# Patient Record
Sex: MALE | Race: OTHER | HISPANIC OR LATINO | URBAN - METROPOLITAN AREA
[De-identification: names, ages, dates, MRNs, and addresses within clinical notes are randomized per-mention and may not be internally consistent; named-entity substitution may affect disease eponyms.]

---

## 2017-04-24 ENCOUNTER — EMERGENCY (EMERGENCY)
Facility: HOSPITAL | Age: 52
LOS: 1 days | Discharge: HOME | End: 2017-04-24
Attending: EMERGENCY MEDICINE | Admitting: EMERGENCY MEDICINE
Payer: MEDICAID

## 2017-04-24 VITALS
DIASTOLIC BLOOD PRESSURE: 77 MMHG | OXYGEN SATURATION: 100 % | TEMPERATURE: 98 F | SYSTOLIC BLOOD PRESSURE: 112 MMHG | RESPIRATION RATE: 18 BRPM | HEART RATE: 57 BPM

## 2017-04-24 VITALS
DIASTOLIC BLOOD PRESSURE: 76 MMHG | OXYGEN SATURATION: 98 % | SYSTOLIC BLOOD PRESSURE: 123 MMHG | TEMPERATURE: 98 F | HEART RATE: 67 BPM | RESPIRATION RATE: 18 BRPM

## 2017-04-24 DIAGNOSIS — Z98.890 OTHER SPECIFIED POSTPROCEDURAL STATES: Chronic | ICD-10-CM

## 2017-04-24 LAB — TROPONIN T SERPL-MCNC: < 0.06 NG/ML — SIGNIFICANT CHANGE UP (ref 0–0.06)

## 2017-04-24 PROCEDURE — 71020: CPT | Mod: 26

## 2017-04-24 PROCEDURE — 99284 EMERGENCY DEPT VISIT MOD MDM: CPT | Mod: 25

## 2017-04-24 RX ORDER — KETOROLAC TROMETHAMINE 30 MG/ML
30 SYRINGE (ML) INJECTION ONCE
Qty: 0 | Refills: 0 | Status: DISCONTINUED | OUTPATIENT
Start: 2017-04-24 | End: 2017-04-24

## 2017-04-24 RX ADMIN — Medication 30 MILLIGRAM(S): at 10:09

## 2017-04-24 RX ADMIN — Medication 30 MILLIGRAM(S): at 11:18

## 2017-04-24 NOTE — ED PROVIDER NOTE - ATTENDING CONTRIBUTION TO CARE
Dr. Ramirez Seen and examined, have discussed plan of care with PA.   I agree with note as stated above, having amended the EMR as needed to reflect the findings. 51M with upper chest and shoulder pains with movement and deep breathing, no SOB at rest, PERC negative, pt. is physically active but denies exertion or trauma closely related to pain- lifted at work, then had inc. pain the next day. CW mild tender, no skin findings, clear lungs, soft, NT abd, full ROM RUE with good distal pulses and strength 5/5.

## 2017-04-24 NOTE — ED ADULT TRIAGE NOTE - CHIEF COMPLAINT QUOTE
c/o right sided chest, shoulder and abdominal pain since Saturday. Denies fever or SOB or any other symptoms.. PMH of HTN

## 2017-04-24 NOTE — ED PROVIDER NOTE - OBJECTIVE STATEMENT
50 yo male with PMHx of HTN presents to the ED with right sided chest pain, right shoulder and arm pain x 3 days. Pt states "pain is on the inside", pt unsure if the pain came on after lifting something at work on Friday. Pt states pain is worse when he takes a deep breath and he gets minimal relief with ibuprofen. Admits to SOB with deep inspiration. Denies h/a, changes in vision, n/v/f/c, abdominal pain, back pain, urinary sx.

## 2017-04-24 NOTE — ED PROVIDER NOTE - CARE PLAN
Principal Discharge DX:	Chest pain on breathing  Instructions for follow-up, activity and diet:	Follow up with your Primary Medical Doctor within 2-3days. If results or reports were given to you, show copies of your reports given to you. Take Naproxen as prescribed on bottle for the pain. Worsening or continued chest pain, shortness of breath, weakness, return to ED.  Secondary Diagnosis:	Shoulder pain  Secondary Diagnosis:	Rib pain

## 2017-04-24 NOTE — ED PROVIDER NOTE - CHPI ED SYMPTOMS NEG
no diaphoresis/no syncope/no nausea/no cough/no chills/no vomiting/no fever/no back pain/no dizziness

## 2017-04-24 NOTE — ED PROVIDER NOTE - PLAN OF CARE
Follow up with your Primary Medical Doctor within 2-3days. If results or reports were given to you, show copies of your reports given to you. Take Naproxen as prescribed on bottle for the pain. Worsening or continued chest pain, shortness of breath, weakness, return to ED.

## 2017-04-25 NOTE — ED POST DISCHARGE NOTE - RESULT SUMMARY
Pt's son called ED regarding Rx for naproxen; [facility] stated not covered by pt's insurance; \[facility]\"" pt was told to go to a different pharmacy.  Pt's son requested to send Rx to [facility] [address], [place].  I spoke to pt (Citizen of [place] speaking) who gave permission for me to speak with his son regarding this call.  I informed son that naproxen is available OTC as Aleve, in case pt's insurance still not covering med at [facility].  I also informed him that naproxen should be taken every 12 hours (not 6) and advised to take med with food.  Pt was recently taking ibuprofen; I informed pt's son that ibuprofen and naproxen should not be taken simultaneously as meds are similar.  Pt's son verbalized understanding to all; will go to [facility] to  Rx.

## 2018-06-10 ENCOUNTER — EMERGENCY (EMERGENCY)
Facility: HOSPITAL | Age: 53
LOS: 1 days | Discharge: HOME | End: 2018-06-10
Attending: EMERGENCY MEDICINE | Admitting: EMERGENCY MEDICINE
Payer: SELF-PAY

## 2018-06-10 VITALS
OXYGEN SATURATION: 100 % | TEMPERATURE: 98 F | RESPIRATION RATE: 16 BRPM | DIASTOLIC BLOOD PRESSURE: 89 MMHG | SYSTOLIC BLOOD PRESSURE: 146 MMHG | HEART RATE: 82 BPM

## 2018-06-10 DIAGNOSIS — Z98.890 OTHER SPECIFIED POSTPROCEDURAL STATES: Chronic | ICD-10-CM

## 2018-06-10 LAB
ALBUMIN SERPL ELPH-MCNC: 4.5 G/DL — SIGNIFICANT CHANGE UP (ref 3.3–5)
ALP SERPL-CCNC: 56 U/L — SIGNIFICANT CHANGE UP (ref 40–120)
ALT FLD-CCNC: 31 U/L — SIGNIFICANT CHANGE UP (ref 4–41)
AST SERPL-CCNC: 23 U/L — SIGNIFICANT CHANGE UP (ref 4–40)
BASOPHILS # BLD AUTO: 0.05 K/UL — SIGNIFICANT CHANGE UP (ref 0–0.2)
BASOPHILS NFR BLD AUTO: 0.4 % — SIGNIFICANT CHANGE UP (ref 0–2)
BILIRUB SERPL-MCNC: 0.4 MG/DL — SIGNIFICANT CHANGE UP (ref 0.2–1.2)
BUN SERPL-MCNC: 10 MG/DL — SIGNIFICANT CHANGE UP (ref 7–23)
CALCIUM SERPL-MCNC: 9.3 MG/DL — SIGNIFICANT CHANGE UP (ref 8.4–10.5)
CHLORIDE SERPL-SCNC: 99 MMOL/L — SIGNIFICANT CHANGE UP (ref 98–107)
CO2 SERPL-SCNC: 27 MMOL/L — SIGNIFICANT CHANGE UP (ref 22–31)
CREAT SERPL-MCNC: 1.14 MG/DL — SIGNIFICANT CHANGE UP (ref 0.5–1.3)
EOSINOPHIL # BLD AUTO: 0.04 K/UL — SIGNIFICANT CHANGE UP (ref 0–0.5)
EOSINOPHIL NFR BLD AUTO: 0.3 % — SIGNIFICANT CHANGE UP (ref 0–6)
GLUCOSE SERPL-MCNC: 99 MG/DL — SIGNIFICANT CHANGE UP (ref 70–99)
HCT VFR BLD CALC: 46.6 % — SIGNIFICANT CHANGE UP (ref 39–50)
HGB BLD-MCNC: 15.2 G/DL — SIGNIFICANT CHANGE UP (ref 13–17)
IMM GRANULOCYTES # BLD AUTO: 0.05 # — SIGNIFICANT CHANGE UP
IMM GRANULOCYTES NFR BLD AUTO: 0.4 % — SIGNIFICANT CHANGE UP (ref 0–1.5)
LYMPHOCYTES # BLD AUTO: 19.2 % — SIGNIFICANT CHANGE UP (ref 13–44)
LYMPHOCYTES # BLD AUTO: 2.42 K/UL — SIGNIFICANT CHANGE UP (ref 1–3.3)
MCHC RBC-ENTMCNC: 29.5 PG — SIGNIFICANT CHANGE UP (ref 27–34)
MCHC RBC-ENTMCNC: 32.6 % — SIGNIFICANT CHANGE UP (ref 32–36)
MCV RBC AUTO: 90.3 FL — SIGNIFICANT CHANGE UP (ref 80–100)
MONOCYTES # BLD AUTO: 1.08 K/UL — HIGH (ref 0–0.9)
MONOCYTES NFR BLD AUTO: 8.6 % — SIGNIFICANT CHANGE UP (ref 2–14)
NEUTROPHILS # BLD AUTO: 8.94 K/UL — HIGH (ref 1.8–7.4)
NEUTROPHILS NFR BLD AUTO: 71.1 % — SIGNIFICANT CHANGE UP (ref 43–77)
NRBC # FLD: 0 — SIGNIFICANT CHANGE UP
PLATELET # BLD AUTO: 293 K/UL — SIGNIFICANT CHANGE UP (ref 150–400)
PMV BLD: 11.7 FL — SIGNIFICANT CHANGE UP (ref 7–13)
POTASSIUM SERPL-MCNC: 4.3 MMOL/L — SIGNIFICANT CHANGE UP (ref 3.5–5.3)
POTASSIUM SERPL-SCNC: 4.3 MMOL/L — SIGNIFICANT CHANGE UP (ref 3.5–5.3)
PROT SERPL-MCNC: 7.2 G/DL — SIGNIFICANT CHANGE UP (ref 6–8.3)
RBC # BLD: 5.16 M/UL — SIGNIFICANT CHANGE UP (ref 4.2–5.8)
RBC # FLD: 12.7 % — SIGNIFICANT CHANGE UP (ref 10.3–14.5)
SODIUM SERPL-SCNC: 138 MMOL/L — SIGNIFICANT CHANGE UP (ref 135–145)
WBC # BLD: 12.58 K/UL — HIGH (ref 3.8–10.5)
WBC # FLD AUTO: 12.58 K/UL — HIGH (ref 3.8–10.5)

## 2018-06-10 PROCEDURE — 99284 EMERGENCY DEPT VISIT MOD MDM: CPT

## 2018-06-10 RX ORDER — KETOROLAC TROMETHAMINE 30 MG/ML
15 SYRINGE (ML) INJECTION ONCE
Qty: 0 | Refills: 0 | Status: DISCONTINUED | OUTPATIENT
Start: 2018-06-10 | End: 2018-06-10

## 2018-06-10 RX ORDER — MECLIZINE HCL 12.5 MG
12.5 TABLET ORAL ONCE
Qty: 0 | Refills: 0 | Status: COMPLETED | OUTPATIENT
Start: 2018-06-10 | End: 2018-06-10

## 2018-06-10 RX ORDER — CIPROFLOXACIN/HYDROCORTISONE 0.2 %-1 %
3 SUSPENSION, DROPS(FINAL DOSAGE FORM)(ML) OTIC (EAR) ONCE
Qty: 0 | Refills: 0 | Status: COMPLETED | OUTPATIENT
Start: 2018-06-10 | End: 2018-06-10

## 2018-06-10 RX ORDER — SODIUM CHLORIDE 9 MG/ML
1000 INJECTION INTRAMUSCULAR; INTRAVENOUS; SUBCUTANEOUS ONCE
Qty: 0 | Refills: 0 | Status: COMPLETED | OUTPATIENT
Start: 2018-06-10 | End: 2018-06-10

## 2018-06-10 RX ORDER — MECLIZINE HCL 12.5 MG
1 TABLET ORAL
Qty: 15 | Refills: 0 | OUTPATIENT
Start: 2018-06-10 | End: 2018-06-14

## 2018-06-10 RX ORDER — CIPROFLOXACIN AND DEXAMETHASONE 3; 1 MG/ML; MG/ML
4 SUSPENSION/ DROPS AURICULAR (OTIC) ONCE
Qty: 0 | Refills: 0 | Status: DISCONTINUED | OUTPATIENT
Start: 2018-06-10 | End: 2018-06-10

## 2018-06-10 RX ADMIN — SODIUM CHLORIDE 1000 MILLILITER(S): 9 INJECTION INTRAMUSCULAR; INTRAVENOUS; SUBCUTANEOUS at 18:03

## 2018-06-10 RX ADMIN — Medication 3 DROP(S): at 19:19

## 2018-06-10 RX ADMIN — Medication 15 MILLIGRAM(S): at 18:02

## 2018-06-10 RX ADMIN — Medication 12.5 MILLIGRAM(S): at 18:02

## 2018-06-10 RX ADMIN — Medication 1 TABLET(S): at 18:57

## 2018-06-10 RX ADMIN — Medication 15 MILLIGRAM(S): at 19:19

## 2018-06-10 NOTE — ED PROVIDER NOTE - RIGHT EAR
EXTERNAL CANAL INFLAMMATION/TM RED/AURICULAR/TRAGAL TENDERNESS/minimal swelling to skin at preauricular region w/o fluctuance; no redness; no warmth

## 2018-06-10 NOTE — ED PROVIDER NOTE - PROGRESS NOTE DETAILS
SEEMA JAY: Pt notes improvement in pain.  Pt medically stable for discharge.  Pt to follow up with PMD and ENT (referral list provided).

## 2018-06-10 NOTE — ED PROVIDER NOTE - PLAN OF CARE
Advance activity as tolerated.  Continue all previously prescribed medications as directed unless otherwise instructed.  Apply Cipro-Hydrocortisone drops to right ear three drops twice a day for 1 week.  Take Augmentin twice a day for 1 week.  Take Motrin (also sold as Advil or Ibuprofen) 400-600 mg (two or three 200 mg over the counter pills) every 8 hours as needed for moderate pain or fevers-- take with food. Take Tylenol 650mg (Two 325 mg pills) every 4-6 hours as needed for pain or fevers. Follow up with your primary care physician and ENT (referral list provided) in 48-72 hours- bring copies of your results.  Return to the ER for worsening or persistent symptoms, including but not limited to fevers, worsening/persistent pain, swelling, drainage from ear, dizziness, difficulty walking, headaches and/or ANY NEW OR CONCERNING SYMPTOMS. If you have issues obtaining follow up, please call: [phone] to obtain a doctor or specialist who takes your insurance in your area.  You may call [phone] to make an appointment with the internal medicine clinic.

## 2018-06-10 NOTE — ED PROVIDER NOTE - CARE PLAN
Principal Discharge DX:	Otitis externa  Assessment and plan of treatment:	Advance activity as tolerated.  Continue all previously prescribed medications as directed unless otherwise instructed.  Apply Cipro-Hydrocortisone drops to right ear three drops twice a day for 1 week.  Take Augmentin twice a day for 1 week.  Take Motrin (also sold as Advil or Ibuprofen) 400-600 mg (two or three 200 mg over the counter pills) every 8 hours as needed for moderate pain or fevers-- take with food. Take Tylenol 650mg (Two 325 mg pills) every 4-6 hours as needed for pain or fevers. Follow up with your primary care physician and ENT (referral list provided) in 48-72 hours- bring copies of your results.  Return to the ER for worsening or persistent symptoms, including but not limited to fevers, worsening/persistent pain, swelling, drainage from ear, dizziness, difficulty walking, headaches and/or ANY NEW OR CONCERNING SYMPTOMS. If you have issues obtaining follow up, please call: [phone] to obtain a doctor or specialist who takes your insurance in your area.  You may call [phone] to make an appointment with the internal medicine clinic.

## 2018-06-10 NOTE — ED PROVIDER NOTE - OBJECTIVE STATEMENT
Pt is a 53 y/o M nonsmoker PMHx spine surgery p/w Pt is a 53 y/o M nonsmoker PMHx spine surgery p/w right ear pain since yesterday.  Pt speaks Amharic; pacific  ID# [id number] used.  Pt states he had gradual onset, worsening right ear pain, pointing to inner ear and skin at pre-auricular region.  Pt notes he had similar symptoms years ago which improved with antibiotics.  Pt also notes mild ringing and feeling off balance.  Denies any fevers, chills, trauma, drainage from ear.

## 2018-06-10 NOTE — ED PROVIDER NOTE - MEDICAL DECISION MAKING DETAILS
Pt is a 51 y/o M nonsmoker PMHx spine surgery p/w right ear pain since yesterday -- likely otitis externa with otitis media -- labs, antibiotic ear drops, augmentin, ENT follow up

## 2018-06-10 NOTE — ED PROVIDER NOTE - ATTENDING CONTRIBUTION TO CARE
I was physically present for the E/M service provided. I agree with above history, physical, and plan which I have reviewed and edited where appropriate. I was physically present for the key portions of the service provided.    52M PMH of LBP p/w right ear pain I was physically present for the E/M service provided. I agree with above history, physical, and plan which I have reviewed and edited where appropriate. I was physically present for the key portions of the service provided.    52M PMH of LBP p/w right ear pain #[id number] I was physically present for the E/M service provided. I agree with above history, physical, and plan which I have reviewed and edited where appropriate. I was physically present for the key portions of the service provided.    52M PMH of LBP p/w right ear pain x1 day a/w dizziness. No fever. No dental or throat pain.  #[id number]. Afebrile. Right ear: moderately swollen external ear canal without exudate. Unable to visualize TM due to swelling. Mild erythema. Mild pain with auricle retraction. Mild erythema of tragus. No mastoid TTP. Neurologic exam: A&O x3, speech clear, LI, CN II-XII intact, motor strength +5/5 in all extremities, sensation equal bilaterally, finger-to-nose normal. Will tx with Abx outpt and ENT f/u as needed.

## 2018-09-25 NOTE — ED ADULT NURSE NOTE - PRO INTERPRETER NEED 2
Health Maintenance Due   Topic Date Due   • Zoster Vaccine (1 of 2) 06/20/1989   • Diabetes Eye Exam  03/09/2017   • Influenza Vaccine (1) 07/01/2018       Patient is due for topics as listed above but declines Immunization(s) Influenza and Shingles at this time.  Declination signed for Immunization(s) Influenza and Shingles.      Unaddressed Risk Adjusted HCC Categories and Diagnoses  HCC 12 - Breast, Prostate, Other Cancers & Tumors   Unaddressed Dx:Ductal Carcinoma (Cms/Hcc)   - Vascular Disease   Unaddressed Dx:Pvd (Peripheral Vascular Disease) (Cms/Hcc)         Japanese

## 2020-07-30 NOTE — ED PROVIDER NOTE - PROGRESS NOTE DETAILS
SEEMA Virk: Xray shows clear lungs, no evidence of rib fx. CE negative. Pt pain improved with toradol, send home with follow up and naproxen.
Not applicable

## 2021-05-27 ENCOUNTER — EMERGENCY (EMERGENCY)
Facility: HOSPITAL | Age: 56
LOS: 1 days | Discharge: HOME | End: 2021-05-27
Attending: EMERGENCY MEDICINE | Admitting: EMERGENCY MEDICINE
Payer: MEDICAID

## 2021-05-27 VITALS
TEMPERATURE: 98 F | DIASTOLIC BLOOD PRESSURE: 99 MMHG | HEART RATE: 64 BPM | SYSTOLIC BLOOD PRESSURE: 136 MMHG | OXYGEN SATURATION: 97 % | RESPIRATION RATE: 18 BRPM

## 2021-05-27 DIAGNOSIS — Z98.890 OTHER SPECIFIED POSTPROCEDURAL STATES: Chronic | ICD-10-CM

## 2021-05-27 PROCEDURE — 99284 EMERGENCY DEPT VISIT MOD MDM: CPT | Mod: 25

## 2021-05-27 NOTE — ED ADULT TRIAGE NOTE - CHIEF COMPLAINT QUOTE
Pt complaining of pain to crusted over lesions to L chest area and L upper back area x1 week. Denies PMH.

## 2021-05-28 PROBLEM — I10 ESSENTIAL (PRIMARY) HYPERTENSION: Chronic | Status: ACTIVE | Noted: 2017-04-24

## 2021-05-28 RX ORDER — VALACYCLOVIR 500 MG/1
1000 TABLET, FILM COATED ORAL ONCE
Refills: 0 | Status: COMPLETED | OUTPATIENT
Start: 2021-05-28 | End: 2021-05-28

## 2021-05-28 RX ORDER — VALACYCLOVIR 500 MG/1
1 TABLET, FILM COATED ORAL
Qty: 21 | Refills: 0
Start: 2021-05-28 | End: 2021-06-03

## 2021-05-28 RX ORDER — IBUPROFEN 200 MG
600 TABLET ORAL ONCE
Refills: 0 | Status: COMPLETED | OUTPATIENT
Start: 2021-05-28 | End: 2021-05-28

## 2021-05-28 RX ORDER — ACETAMINOPHEN 500 MG
975 TABLET ORAL ONCE
Refills: 0 | Status: COMPLETED | OUTPATIENT
Start: 2021-05-28 | End: 2021-05-28

## 2021-05-28 RX ADMIN — Medication 600 MILLIGRAM(S): at 02:28

## 2021-05-28 RX ADMIN — VALACYCLOVIR 1000 MILLIGRAM(S): 500 TABLET, FILM COATED ORAL at 02:28

## 2021-05-28 RX ADMIN — Medication 975 MILLIGRAM(S): at 02:28

## 2021-05-28 NOTE — ED PROVIDER NOTE - PATIENT PORTAL LINK FT
You can access the FollowMyHealth Patient Portal offered by [facility] by registering at the following website: [email or web address]. By joining [employer]’s FollowMyHealth portal, you will also be able to view your health information using other applications (apps) compatible with our system.

## 2021-05-28 NOTE — ED PROVIDER NOTE - NS ED ROS FT
In additional the that documented in the HPI, the additional ROS was obtained:    CONSTITUTIONAL: No fever, no chills  EYES: no change in vision, no blurred vision  HEENT: no trouble swallowing, no trouble speaking, no sore throat  NECK: no pain, no stiffness  CV: no chest pain, no palpitations  RESP: no cough, no shortness of breath  GI: no abdominal pain, no nausea, no vomiting, no diarrhea, no constipation, no BRBPR or melena  : No dysuria, no frequency  NEURO: no headache, no new numbness, no weakness, no dizziness  SKIN: no new rashes  HEME: No easy bruising or bleeding, denies immunocompromising conditions  MSK: No joint pain, no recent trauma  Eder Agustin M.D. -Resident

## 2021-05-28 NOTE — ED PROVIDER NOTE - NSFOLLOWUPINSTRUCTIONS_ED_ALL_ED_FT
A medication called Valtrex was sent to your pharmacy, please complete the full course of medication as prescribed.  Please follow up with your primary doctor in 1-2 weeks.  You should get the SHINGRIX vaccine for shingles after the lesions resolve.    You can use 500-1000mg Tylenol every 6 hours for pain - as needed; maximal daily dose 3000mg.  This is an over-the-counter medications - please respect the warnings on the label. This medication come with certain risks and side effects that you need to discuss with your doctor, especially if you are taking it for a prolonged period.  You can use 400-600mg Ibuprofen (such as motrin or advil) every 6 to 8 hours as needed for pain control.  Take ibuprofen with food or milk to lessen stomach upset.  This is an over-the-counter medication please respect the warnings on the label. All medications come with certain risks and side effects that you need to discuss with your doctor, especially if you are taking them for a prolonged period.     Shingles (herpes zoster) causes pain and a blistered rash. The rash can appear anywhere on the body but will be on only one side of the body, the left or right. It will be in a band, a strip, or a small area. The pain can be very severe. Shingles can also cause tingling or itching in the area of the rash. The blisters scab over after a few days and heal in 2 to 4 weeks. Medicines can help you feel better and may help prevent more serious problems caused by shingles.    Shingles is caused by the same virus that causes chickenpox. When you have chickenpox, the virus gets into your nerve roots and stays there (becomes dormant) long after you get over the chickenpox. If the virus becomes active again, it can cause shingles.    Follow-up care is a key part of your treatment and safety. Be sure to make and go to all appointments, and call your doctor if you are having problems. It's also a good idea to know your test results and keep a list of the medicines you take.    How can you care for yourself at home?  Be safe with medicines. Take your medicines exactly as prescribed. Call your doctor if you think you are having a problem with your medicine. Antiviral medicine helps you get better faster.  Try not to scratch or pick at the blisters. They will crust over and fall off on their own if you leave them alone.  Put cool, wet cloths on the area to relieve pain and itching. You can also use calamine lotion. Try not to use so much lotion that it cakes and is hard to get off.  Put cornstarch or baking soda on the sores to help dry them out so they heal faster.  Do not use thick ointment, such as petroleum jelly, on the sores. This will keep them from drying and healing.  To help remove loose crusts, soak them in tap water. This can help decrease oozing, and dry and soothe the skin.  Take an over-the-counter pain medicine, such as acetaminophen (Tylenol), ibuprofen (Advil, Motrin), or naproxen (Aleve). Read and follow all instructions on the label.  Avoid close contact with people until the blisters have healed. It is very important for you to avoid contact with anyone who has never had chickenpox or the chickenpox vaccine. Pregnant women, young babies, and anyone else who has a hard time fighting infection (such as someone with HIV, diabetes, or cancer) is especially at risk.  When should you call for help?  	  Call your doctor now or seek immediate medical care if:    You have a new or higher fever.  You have a severe headache and a stiff neck.  You lose the ability to think clearly.  The rash spreads to your forehead, nose, eyes, or eyelids.  You have eye pain, or your vision gets worse.  You have new pain in your face, or you cannot move the muscles in your face.  Blisters spread to new parts of your body.  Watch closely for changes in your health, and be sure to contact your doctor if:    The rash has not healed after 2 to 4 weeks.  You still have pain after the rash has healed.

## 2021-05-28 NOTE — ED PROVIDER NOTE - PHYSICAL EXAMINATION
Vital signs reviewed.  CONSTITUTIONAL: NAD, awake  HEAD: Normocephalic; atraumatic  EYES: EOMI, no nystagmus, no conjunctival injection, no scleral icterus  MOUTH/THROAT:  MMM, trachea midline  NECK: Trachea midline, no JVD  CV: Normal S1, S2; no audible murmurs; extremities WWP  RESP: normal work of breathing; CTAB, no stridor  ABD: soft, non-distended; non-tender  : Deferred  MSK/EXT: no edema, no limited ROM  SKIN: Color appropriate for race.  Left chest has crusted over herpetic lesions from under axilla to nipple, with nipple lesion having no crusting over.    NEURO: No focal sensory or motor deficits

## 2021-05-28 NOTE — ED PROVIDER NOTE - ATTENDING CONTRIBUTION TO CARE
MD Wong:  I performed a face to face bedside interview with patient regarding history of present illness, review of symptoms and past medical history. I completed an independent physical exam(documented below).  I have discussed patient's plan of care with resident.   I agree with note as stated above, having amended the EMR as needed to reflect my findings. I have discussed the assessment and plan of care.  This includes during the time I functioned as the attending physician for this patient.  PE:  Gen: Alert, NAD  Head: NC, AT,  EOMI, normal lids/conjunctiva  ENT:  normal hearing, patent oropharynx without erythema/exudate  Neck: +supple, no tenderness/meningismus/JVD, +Trachea midline  Chest: no chest wall tenderness, equal chest rise, see skin exam notes below  Pulm: Bilateral BS, normal resp effort, no wheeze/stridor/retractions  CV: RRR, no M/R/G, +dist pulses  Abd: +BS, soft, NT/ND  Rectal: deferred  Mskel: no edema/erythema/cyanosis  Skin: erythematous crusted over lesions in dermatomal pattern Left chest  Neuro: AAOx3  MDM:  54yo M, denies pmh, c/o 1wk burning pain left chest followed by vesicular lesions over this same area which are now crusting over, no improvement w/ clotrimazole cream.  Had chicken pox approx 30yrs ago. H&P most consistent w/ herpes zoster. meds, dc.

## 2021-05-28 NOTE — ED PROVIDER NOTE - OBJECTIVE STATEMENT
55M no pmh who presents with 5-7 days of burning pain and lesions which erupted from under his left armpit across his chest.  He notes these lesions crusted over, but he continues to have significant burning pain which "feels like his skin is on fire".  He notes that he had chicken pox as a young adult at age 26.  Lesions are limited to one dermatome across the left chest, and he notes the burning does follow a line across to his back, although he has no lesions in this area.  He denies involvement in other areas, denies vision changes or hearing changes, or facial lesions.  He was using lotramine cream on this without improvement.    specifically denies headache, sore throat, chest pain, shortness of breath, cough, abdominal pain, nausea, vomiting, constipation, diarrhea, back or neck pain, or lower extremity edema.

## 2021-05-28 NOTE — ED PROVIDER NOTE - PROGRESS NOTE DETAILS
Patient updated with discharge plan and in agreement with valtrex x1 week and pcp f/u.  He will  the valtrex tomorrow, he said.
FAMILY HISTORY:  FH: colon cancer, father  FH: heart attack, mother- age 39

## 2023-03-28 NOTE — ED PROVIDER NOTE - NEUROLOGICAL COORDINATION
PT WAS SENT SEVERAL LETTERS TO MAKE APPT...CERTIFIED LETTER WAS SENT 1-18-23...LETTER RETURNED TO [facility] UNCLAIMED...DOCTOR UNRESOLVED LETTER WAS FAXED TO DR MARIE...CASE REMAINS UNRESOLVED...S.O  
normal

## 2023-07-17 ENCOUNTER — EMERGENCY (EMERGENCY)
Facility: HOSPITAL | Age: 58
LOS: 1 days | Discharge: HOME | End: 2023-07-17
Attending: STUDENT IN AN ORGANIZED HEALTH CARE EDUCATION/TRAINING PROGRAM | Admitting: EMERGENCY MEDICINE
Payer: MEDICAID

## 2023-07-17 VITALS
RESPIRATION RATE: 17 BRPM | DIASTOLIC BLOOD PRESSURE: 93 MMHG | TEMPERATURE: 98 F | SYSTOLIC BLOOD PRESSURE: 135 MMHG | OXYGEN SATURATION: 100 % | HEART RATE: 84 BPM

## 2023-07-17 DIAGNOSIS — Z98.890 OTHER SPECIFIED POSTPROCEDURAL STATES: Chronic | ICD-10-CM

## 2023-07-17 PROCEDURE — 99284 EMERGENCY DEPT VISIT MOD MDM: CPT

## 2023-07-17 RX ORDER — DIAZEPAM 5 MG
5 TABLET ORAL ONCE
Refills: 0 | Status: DISCONTINUED | OUTPATIENT
Start: 2023-07-17 | End: 2023-07-17

## 2023-07-17 RX ORDER — LIDOCAINE 4 G/100G
1 CREAM TOPICAL ONCE
Refills: 0 | Status: COMPLETED | OUTPATIENT
Start: 2023-07-17 | End: 2023-07-17

## 2023-07-17 RX ORDER — CYCLOBENZAPRINE HYDROCHLORIDE 10 MG/1
1 TABLET, FILM COATED ORAL
Qty: 9 | Refills: 0
Start: 2023-07-17 | End: 2023-07-19

## 2023-07-17 RX ORDER — KETOROLAC TROMETHAMINE 30 MG/ML
30 SYRINGE (ML) INJECTION ONCE
Refills: 0 | Status: DISCONTINUED | OUTPATIENT
Start: 2023-07-17 | End: 2023-07-17

## 2023-07-17 RX ORDER — CYCLOBENZAPRINE HYDROCHLORIDE 10 MG/1
10 TABLET, FILM COATED ORAL ONCE
Refills: 0 | Status: COMPLETED | OUTPATIENT
Start: 2023-07-17 | End: 2023-07-17

## 2023-07-17 RX ADMIN — LIDOCAINE 1 PATCH: 4 CREAM TOPICAL at 16:46

## 2023-07-17 RX ADMIN — Medication 5 MILLIGRAM(S): at 19:05

## 2023-07-17 RX ADMIN — Medication 30 MILLIGRAM(S): at 16:46

## 2023-07-17 RX ADMIN — CYCLOBENZAPRINE HYDROCHLORIDE 10 MILLIGRAM(S): 10 TABLET, FILM COATED ORAL at 16:46

## 2023-07-17 NOTE — ED PROVIDER NOTE - NSFOLLOWUPINSTRUCTIONS_ED_ALL_ED_FT
Follow up with your primary care doctor and Neurosurgery doctor  Take the medication as prescribed  avoid driving or use heavy machinery while on these medications  return to Emergency room with any worsening symptoms or no improvements

## 2023-07-17 NOTE — ED PROVIDER NOTE - PATIENT PORTAL LINK FT
You can access the FollowMyHealth Patient Portal offered by [facility] by registering at the following website: [email or web address]. By joining [employer]’s FollowMyHealth portal, you will also be able to view your health information using other applications (apps) compatible with our system.

## 2023-07-17 NOTE — ED ADULT NURSE NOTE - OBJECTIVE STATEMENT
patient alert and oriented x4 ambulatory at baseline, c/o R sided lower back pain. patient well appearing, no acute distress noted. medication given and tolerated well per EMAR.

## 2023-07-17 NOTE — ED PROVIDER NOTE - CLINICAL SUMMARY MEDICAL DECISION MAKING FREE TEXT BOX
56 yo male presents with back pain most consistent with scaitica back pain. Differential diagnoses includes lumbago versus musculoskeletal spasm / strain versus sciatica. No back pain red flags on history or physical. Presentation not consistent with malignancy (lack of history of malignancy, lack of B symptoms), fracture (no trauma, no bony tenderness to palpation), cauda equina (no bowel or urinary incontinence/retention, no saddle anesthesia, no distal weakness), AAA, viscus perforation, osteomyelitis or epidural abscess (no IVDU, vertebral tenderness), renal colic, pyelonephritis (afebrile, no CVAT, no urinary symptoms). Given the clinical picture, no indication for imaging at this time. will do pain control and reassess.

## 2023-07-17 NOTE — ED PROVIDER NOTE - PHYSICAL EXAMINATION
MSK: healed surgical scar in the lumbar area. there is paraspinal muscle tenderness on the left. no swelling or erythema.  positive left leg raise test.   no weakness. sensation intact. steady gait

## 2023-07-17 NOTE — ED PROVIDER NOTE - PROGRESS NOTE DETAILS
pt endorses no improvement of the pain after the medication.   will give oxycodone and obtain a CT of the back given there was history of back surgery. pain improved. pt is no ambulating with steady gait  pt wishes to go home

## 2023-07-17 NOTE — ED PROVIDER NOTE - OBJECTIVE STATEMENT
56 yo male with pmhx of sciatica s/p surgery  in 2012 in [place] presents to the ED with left sided back pain x1 week.  pt is Bulgarian speaking and he is accompanied by his son and wishes him to translate for him. he states for the last week he has been having left lower back pain that has been getting progressively worse. pt denies falls or trauma. he states the pain feels like his sciatic pain. he denies bowl or bladder incont. he states he has been ambulating with steady gait however painful. pt denies fever or chills.

## 2024-02-28 NOTE — ED ADULT NURSE NOTE - CAS EDP DISCH TYPE
Initiate Treatment: Patient has Efudex cream. Will apply to forehead once daily for three weeks. Detail Level: Zone Home

## 2024-12-26 NOTE — ED ADULT TRIAGE NOTE - ARRIVAL FROM
Pt admitted to floor from cath lab. Alert et oriented x4. Denies chest pain,sob or nausea. Rt wrist et rt groin remains stable. No bleeding,swelling or hematoma noted.Electronically signed by Nell Obrien RN on 12/26/2024 at 4:13 PM    Home